# Patient Record
Sex: FEMALE | Race: WHITE | NOT HISPANIC OR LATINO | URBAN - METROPOLITAN AREA
[De-identification: names, ages, dates, MRNs, and addresses within clinical notes are randomized per-mention and may not be internally consistent; named-entity substitution may affect disease eponyms.]

---

## 2019-02-25 ENCOUNTER — OFFICE (OUTPATIENT)
Dept: URBAN - METROPOLITAN AREA CLINIC 19 | Facility: CLINIC | Age: 59
End: 2019-02-25

## 2019-02-25 VITALS
WEIGHT: 183 LBS | DIASTOLIC BLOOD PRESSURE: 69 MMHG | SYSTOLIC BLOOD PRESSURE: 137 MMHG | HEART RATE: 93 BPM | HEIGHT: 66 IN

## 2019-02-25 DIAGNOSIS — R13.10 DYSPHAGIA, UNSPECIFIED: ICD-10-CM

## 2019-02-25 DIAGNOSIS — R07.9 CHEST PAIN, UNSPECIFIED: ICD-10-CM

## 2019-02-25 DIAGNOSIS — K52.89 OTHER SPECIFIED NONINFECTIVE GASTROENTERITIS AND COLITIS: ICD-10-CM

## 2019-02-25 DIAGNOSIS — R11.0 NAUSEA: ICD-10-CM

## 2019-02-25 DIAGNOSIS — Z86.010 PERSONAL HISTORY OF COLONIC POLYPS: ICD-10-CM

## 2019-02-25 PROCEDURE — 99214 OFFICE O/P EST MOD 30 MIN: CPT | Performed by: INTERNAL MEDICINE

## 2019-05-02 ENCOUNTER — OFFICE (OUTPATIENT)
Dept: URBAN - METROPOLITAN AREA CLINIC 19 | Facility: CLINIC | Age: 59
End: 2019-05-02

## 2019-05-02 VITALS
HEART RATE: 94 BPM | HEIGHT: 66 IN | DIASTOLIC BLOOD PRESSURE: 58 MMHG | SYSTOLIC BLOOD PRESSURE: 121 MMHG | WEIGHT: 182 LBS

## 2019-05-02 DIAGNOSIS — D50.9 IRON DEFICIENCY ANEMIA, UNSPECIFIED: ICD-10-CM

## 2019-05-02 DIAGNOSIS — K52.89 OTHER SPECIFIED NONINFECTIVE GASTROENTERITIS AND COLITIS: ICD-10-CM

## 2019-05-02 DIAGNOSIS — Z86.010 PERSONAL HISTORY OF COLONIC POLYPS: ICD-10-CM

## 2019-05-02 PROCEDURE — 99213 OFFICE O/P EST LOW 20 MIN: CPT | Performed by: INTERNAL MEDICINE

## 2019-05-02 RX ORDER — SODIUM PICOSULFATE, MAGNESIUM OXIDE, AND ANHYDROUS CITRIC ACID 10; 3.5; 12 MG/160ML; G/160ML; G/160ML
LIQUID ORAL
Qty: 1 | Refills: 0 | Status: COMPLETED
Start: 2019-05-02 | End: 2019-08-05

## 2019-08-05 ENCOUNTER — AMBULATORY SURGICAL CENTER (OUTPATIENT)
Dept: URBAN - METROPOLITAN AREA SURGERY 2 | Facility: SURGERY | Age: 59
End: 2019-08-05
Payer: COMMERCIAL

## 2019-08-05 ENCOUNTER — OFFICE (OUTPATIENT)
Dept: URBAN - METROPOLITAN AREA PATHOLOGY 22 | Facility: PATHOLOGY | Age: 59
End: 2019-08-05
Payer: COMMERCIAL

## 2019-08-05 VITALS
DIASTOLIC BLOOD PRESSURE: 57 MMHG | SYSTOLIC BLOOD PRESSURE: 106 MMHG | HEIGHT: 66 IN | RESPIRATION RATE: 16 BRPM | SYSTOLIC BLOOD PRESSURE: 117 MMHG | SYSTOLIC BLOOD PRESSURE: 106 MMHG | HEIGHT: 66 IN | SYSTOLIC BLOOD PRESSURE: 106 MMHG | DIASTOLIC BLOOD PRESSURE: 57 MMHG | OXYGEN SATURATION: 96 % | TEMPERATURE: 98 F | DIASTOLIC BLOOD PRESSURE: 60 MMHG | DIASTOLIC BLOOD PRESSURE: 67 MMHG | RESPIRATION RATE: 16 BRPM | HEART RATE: 84 BPM | HEART RATE: 83 BPM | TEMPERATURE: 98 F | TEMPERATURE: 97.5 F | RESPIRATION RATE: 16 BRPM | DIASTOLIC BLOOD PRESSURE: 60 MMHG | RESPIRATION RATE: 18 BRPM | RESPIRATION RATE: 18 BRPM | WEIGHT: 190 LBS | SYSTOLIC BLOOD PRESSURE: 102 MMHG | SYSTOLIC BLOOD PRESSURE: 100 MMHG | DIASTOLIC BLOOD PRESSURE: 67 MMHG | TEMPERATURE: 98 F | SYSTOLIC BLOOD PRESSURE: 117 MMHG | DIASTOLIC BLOOD PRESSURE: 56 MMHG | DIASTOLIC BLOOD PRESSURE: 60 MMHG | HEART RATE: 83 BPM | DIASTOLIC BLOOD PRESSURE: 67 MMHG | WEIGHT: 190 LBS | OXYGEN SATURATION: 96 % | SYSTOLIC BLOOD PRESSURE: 100 MMHG | HEART RATE: 80 BPM | HEART RATE: 82 BPM | WEIGHT: 190 LBS | HEART RATE: 84 BPM | HEART RATE: 84 BPM | DIASTOLIC BLOOD PRESSURE: 56 MMHG | HEART RATE: 80 BPM | OXYGEN SATURATION: 96 % | SYSTOLIC BLOOD PRESSURE: 102 MMHG | DIASTOLIC BLOOD PRESSURE: 57 MMHG | SYSTOLIC BLOOD PRESSURE: 100 MMHG | HEART RATE: 82 BPM | HEART RATE: 82 BPM | TEMPERATURE: 97.5 F | HEART RATE: 83 BPM | RESPIRATION RATE: 18 BRPM | HEART RATE: 80 BPM | SYSTOLIC BLOOD PRESSURE: 117 MMHG | DIASTOLIC BLOOD PRESSURE: 56 MMHG | HEIGHT: 66 IN | TEMPERATURE: 97.5 F | SYSTOLIC BLOOD PRESSURE: 102 MMHG

## 2019-08-05 DIAGNOSIS — Z86.010 PERSONAL HISTORY OF COLONIC POLYPS: ICD-10-CM

## 2019-08-05 DIAGNOSIS — D50.9 IRON DEFICIENCY ANEMIA, UNSPECIFIED: ICD-10-CM

## 2019-08-05 DIAGNOSIS — R19.7 DIARRHEA, UNSPECIFIED: ICD-10-CM

## 2019-08-05 DIAGNOSIS — R07.9 CHEST PAIN, UNSPECIFIED: ICD-10-CM

## 2019-08-05 DIAGNOSIS — J45.909 UNSPECIFIED ASTHMA, UNCOMPLICATED: ICD-10-CM

## 2019-08-05 PROBLEM — D53.9 UNEXPLAINED IRON DEFICIENCY ANEMIA: Status: ACTIVE | Noted: 2019-08-05

## 2019-08-05 PROBLEM — K52.89 CLINICALLY SIGNIFICANT DIARRHEA OF UNEXPLAINED ORIGIN: Status: ACTIVE | Noted: 2019-08-05

## 2019-08-05 PROCEDURE — 88305 TISSUE EXAM BY PATHOLOGIST: CPT | Performed by: INTERNAL MEDICINE

## 2019-08-05 PROCEDURE — 88342 IMHCHEM/IMCYTCHM 1ST ANTB: CPT | Performed by: INTERNAL MEDICINE

## 2019-08-05 PROCEDURE — 43235 EGD DIAGNOSTIC BRUSH WASH: CPT | Mod: 51 | Performed by: INTERNAL MEDICINE

## 2019-08-05 PROCEDURE — 45380 COLONOSCOPY AND BIOPSY: CPT | Performed by: INTERNAL MEDICINE

## 2019-08-27 ENCOUNTER — OFFICE (OUTPATIENT)
Dept: URBAN - METROPOLITAN AREA CLINIC 11 | Facility: CLINIC | Age: 59
End: 2019-08-27
Payer: COMMERCIAL

## 2019-08-27 DIAGNOSIS — D50.9 IRON DEFICIENCY ANEMIA, UNSPECIFIED: ICD-10-CM

## 2019-08-27 PROCEDURE — 91110 GI TRC IMG INTRAL ESOPH-ILE: CPT | Performed by: INTERNAL MEDICINE

## 2021-06-14 ENCOUNTER — OFFICE (OUTPATIENT)
Dept: URBAN - METROPOLITAN AREA CLINIC 19 | Facility: CLINIC | Age: 61
End: 2021-06-14

## 2021-06-14 VITALS
DIASTOLIC BLOOD PRESSURE: 53 MMHG | HEIGHT: 66 IN | OXYGEN SATURATION: 95 % | HEART RATE: 85 BPM | SYSTOLIC BLOOD PRESSURE: 121 MMHG | WEIGHT: 176 LBS

## 2021-06-14 DIAGNOSIS — R74.01 ELEVATION OF LEVELS OF LIVER TRANSAMINASE LEVELS: ICD-10-CM

## 2021-06-14 LAB
HEPATIC FUNCTION PANEL (7): ALBUMIN: 4.5 G/DL (ref 3.8–4.8)
HEPATIC FUNCTION PANEL (7): ALKALINE PHOSPHATASE: 134 IU/L — HIGH (ref 48–121)
HEPATIC FUNCTION PANEL (7): ALT (SGPT): 47 IU/L — HIGH (ref 0–32)
HEPATIC FUNCTION PANEL (7): AST (SGOT): 59 IU/L — HIGH (ref 0–40)
HEPATIC FUNCTION PANEL (7): BILIRUBIN, DIRECT: 0.3 MG/DL (ref 0–0.4)
HEPATIC FUNCTION PANEL (7): BILIRUBIN, TOTAL: 0.7 MG/DL (ref 0–1.2)
HEPATIC FUNCTION PANEL (7): PROTEIN, TOTAL: 7.1 G/DL (ref 6–8.5)

## 2021-06-14 PROCEDURE — 99214 OFFICE O/P EST MOD 30 MIN: CPT | Performed by: INTERNAL MEDICINE

## 2021-06-15 ENCOUNTER — OFFICE (OUTPATIENT)
Dept: URBAN - METROPOLITAN AREA CLINIC 14 | Facility: CLINIC | Age: 61
End: 2021-06-15

## 2021-06-15 VITALS — HEIGHT: 66 IN

## 2021-06-15 DIAGNOSIS — R74.01 ELEVATION OF LEVELS OF LIVER TRANSAMINASE LEVELS: ICD-10-CM

## 2021-06-15 PROCEDURE — 91200 LIVER ELASTOGRAPHY: CPT | Performed by: INTERNAL MEDICINE

## 2021-06-17 ENCOUNTER — OFFICE (OUTPATIENT)
Dept: URBAN - METROPOLITAN AREA CLINIC 19 | Facility: CLINIC | Age: 61
End: 2021-06-17

## 2021-07-06 ENCOUNTER — OFFICE (OUTPATIENT)
Dept: URBAN - METROPOLITAN AREA CLINIC 19 | Facility: CLINIC | Age: 61
End: 2021-07-06
Payer: COMMERCIAL

## 2021-07-06 VITALS — HEIGHT: 66 IN

## 2021-07-06 DIAGNOSIS — Z23 ENCOUNTER FOR IMMUNIZATION: ICD-10-CM

## 2021-07-06 PROCEDURE — 90636 HEP A/HEP B VACC ADULT IM: CPT | Performed by: INTERNAL MEDICINE

## 2021-07-06 PROCEDURE — 90471 IMMUNIZATION ADMIN: CPT | Performed by: INTERNAL MEDICINE

## 2021-07-20 ENCOUNTER — OFFICE (OUTPATIENT)
Dept: URBAN - METROPOLITAN AREA CLINIC 19 | Facility: CLINIC | Age: 61
End: 2021-07-20

## 2021-07-20 VITALS
HEIGHT: 66 IN | SYSTOLIC BLOOD PRESSURE: 103 MMHG | WEIGHT: 174 LBS | OXYGEN SATURATION: 97 % | HEART RATE: 85 BPM | DIASTOLIC BLOOD PRESSURE: 55 MMHG

## 2021-07-20 DIAGNOSIS — K75.81 NONALCOHOLIC STEATOHEPATITIS (NASH): ICD-10-CM

## 2021-07-20 DIAGNOSIS — K74.69 OTHER CIRRHOSIS OF LIVER: ICD-10-CM

## 2021-07-20 PROCEDURE — 99213 OFFICE O/P EST LOW 20 MIN: CPT | Performed by: NURSE PRACTITIONER

## 2021-07-20 NOTE — SERVICEHPINOTES
Mrs. Salas   Presents for follow-up on her cirrhosis.  A fiber scan showed F4 with median liver stiffness elevated at 41. Her cap score was 247, slightly negative for hepatic steatosis.  She has lost over 100 lb from her heaviest weight.  Blood work was unremarkable for liver disease. Recent blood work showed alkaline phosphatase 134, AST 59, ALT 47. Albumin bilirubin and protein were all unremarkable.  She does have thrombocytopenia with recent platelets of 104. She does have iron deficiency anemia, with GI workup including EGD, colonoscopy, and pill capsule normal. Her AFP in June of this year was slightly elevated at 9.2 and abdominal ultrasound done in May was negative for mass seen in the liver.  It did show intrinsic liver disease.

## 2021-08-06 ENCOUNTER — OFFICE (OUTPATIENT)
Dept: URBAN - METROPOLITAN AREA CLINIC 19 | Facility: CLINIC | Age: 61
End: 2021-08-06

## 2021-09-08 ENCOUNTER — OFFICE (OUTPATIENT)
Dept: URBAN - METROPOLITAN AREA CLINIC 19 | Facility: CLINIC | Age: 61
End: 2021-09-08

## 2021-09-16 ENCOUNTER — OFFICE (OUTPATIENT)
Dept: URBAN - METROPOLITAN AREA CLINIC 19 | Facility: CLINIC | Age: 61
End: 2021-09-16

## 2021-09-16 PROCEDURE — STUDY: HCPCS | Performed by: INTERNAL MEDICINE

## 2021-10-01 ENCOUNTER — AMBULATORY SURGICAL CENTER (OUTPATIENT)
Dept: URBAN - METROPOLITAN AREA SURGERY 2 | Facility: SURGERY | Age: 61
End: 2021-10-01
Payer: COMMERCIAL

## 2021-10-01 VITALS
SYSTOLIC BLOOD PRESSURE: 106 MMHG | HEART RATE: 80 BPM | DIASTOLIC BLOOD PRESSURE: 50 MMHG | DIASTOLIC BLOOD PRESSURE: 41 MMHG | WEIGHT: 166 LBS | DIASTOLIC BLOOD PRESSURE: 39 MMHG | OXYGEN SATURATION: 96 % | DIASTOLIC BLOOD PRESSURE: 47 MMHG | WEIGHT: 166 LBS | OXYGEN SATURATION: 95 % | DIASTOLIC BLOOD PRESSURE: 50 MMHG | DIASTOLIC BLOOD PRESSURE: 64 MMHG | OXYGEN SATURATION: 97 % | OXYGEN SATURATION: 96 % | OXYGEN SATURATION: 97 % | HEART RATE: 74 BPM | HEART RATE: 76 BPM | SYSTOLIC BLOOD PRESSURE: 96 MMHG | RESPIRATION RATE: 17 BRPM | WEIGHT: 166 LBS | HEART RATE: 73 BPM | OXYGEN SATURATION: 94 % | HEART RATE: 80 BPM | HEART RATE: 73 BPM | SYSTOLIC BLOOD PRESSURE: 101 MMHG | SYSTOLIC BLOOD PRESSURE: 96 MMHG | SYSTOLIC BLOOD PRESSURE: 96 MMHG | DIASTOLIC BLOOD PRESSURE: 41 MMHG | DIASTOLIC BLOOD PRESSURE: 39 MMHG | SYSTOLIC BLOOD PRESSURE: 101 MMHG | RESPIRATION RATE: 18 BRPM | OXYGEN SATURATION: 96 % | SYSTOLIC BLOOD PRESSURE: 106 MMHG | OXYGEN SATURATION: 95 % | OXYGEN SATURATION: 94 % | DIASTOLIC BLOOD PRESSURE: 47 MMHG | SYSTOLIC BLOOD PRESSURE: 106 MMHG | SYSTOLIC BLOOD PRESSURE: 102 MMHG | DIASTOLIC BLOOD PRESSURE: 64 MMHG | OXYGEN SATURATION: 97 % | TEMPERATURE: 97.8 F | HEART RATE: 73 BPM | HEART RATE: 76 BPM | HEIGHT: 66 IN | DIASTOLIC BLOOD PRESSURE: 41 MMHG | HEART RATE: 74 BPM | DIASTOLIC BLOOD PRESSURE: 64 MMHG | RESPIRATION RATE: 17 BRPM | HEART RATE: 76 BPM | RESPIRATION RATE: 18 BRPM | HEIGHT: 66 IN | OXYGEN SATURATION: 94 % | DIASTOLIC BLOOD PRESSURE: 47 MMHG | TEMPERATURE: 97 F | DIASTOLIC BLOOD PRESSURE: 50 MMHG | DIASTOLIC BLOOD PRESSURE: 39 MMHG | SYSTOLIC BLOOD PRESSURE: 102 MMHG | SYSTOLIC BLOOD PRESSURE: 90 MMHG | TEMPERATURE: 97 F | TEMPERATURE: 97.8 F | TEMPERATURE: 97.8 F | TEMPERATURE: 97 F | HEART RATE: 80 BPM | SYSTOLIC BLOOD PRESSURE: 90 MMHG | SYSTOLIC BLOOD PRESSURE: 101 MMHG | HEIGHT: 66 IN | SYSTOLIC BLOOD PRESSURE: 102 MMHG | SYSTOLIC BLOOD PRESSURE: 90 MMHG | RESPIRATION RATE: 18 BRPM | HEART RATE: 74 BPM | OXYGEN SATURATION: 95 % | RESPIRATION RATE: 17 BRPM

## 2021-10-01 DIAGNOSIS — I85.00 ESOPHAGEAL VARICES WITHOUT BLEEDING: ICD-10-CM

## 2021-10-01 DIAGNOSIS — K75.81 NONALCOHOLIC STEATOHEPATITIS (NASH): ICD-10-CM

## 2021-10-01 DIAGNOSIS — K74.60 UNSPECIFIED CIRRHOSIS OF LIVER: ICD-10-CM

## 2021-10-01 PROBLEM — K31.89 OTHER DISEASES OF STOMACH AND DUODENUM: Status: ACTIVE | Noted: 2021-10-01

## 2021-10-01 PROCEDURE — 43235 EGD DIAGNOSTIC BRUSH WASH: CPT | Performed by: INTERNAL MEDICINE

## 2021-11-10 ENCOUNTER — OFFICE (OUTPATIENT)
Dept: URBAN - METROPOLITAN AREA CLINIC 19 | Facility: CLINIC | Age: 61
End: 2021-11-10
Payer: COMMERCIAL

## 2021-11-10 VITALS
SYSTOLIC BLOOD PRESSURE: 111 MMHG | DIASTOLIC BLOOD PRESSURE: 41 MMHG | HEIGHT: 66 IN | WEIGHT: 168 LBS | OXYGEN SATURATION: 97 % | HEART RATE: 68 BPM

## 2021-11-10 DIAGNOSIS — R14.3 FLATULENCE: ICD-10-CM

## 2021-11-10 DIAGNOSIS — K75.81 NONALCOHOLIC STEATOHEPATITIS (NASH): ICD-10-CM

## 2021-11-10 DIAGNOSIS — K74.60 UNSPECIFIED CIRRHOSIS OF LIVER: ICD-10-CM

## 2021-11-10 LAB
AFP, SERUM, TUMOR MARKER: 8.3 NG/ML (ref 0–8.3)
AMMONIA, PLASMA: 302 UG/DL — CRITICAL HIGH (ref 34–178)
CBC, PLATELET, NO DIFFERENTIAL: HEMATOCRIT: 40.7 % (ref 34–46.6)
CBC, PLATELET, NO DIFFERENTIAL: HEMOGLOBIN: 13.1 G/DL (ref 11.1–15.9)
CBC, PLATELET, NO DIFFERENTIAL: MCH: 32.2 PG (ref 26.6–33)
CBC, PLATELET, NO DIFFERENTIAL: MCHC: 32.2 G/DL (ref 31.5–35.7)
CBC, PLATELET, NO DIFFERENTIAL: MCV: 100 FL — HIGH (ref 79–97)
CBC, PLATELET, NO DIFFERENTIAL: PLATELETS: 100 X10E3/UL — CRITICAL LOW (ref 150–450)
CBC, PLATELET, NO DIFFERENTIAL: RBC: 4.07 X10E6/UL (ref 3.77–5.28)
CBC, PLATELET, NO DIFFERENTIAL: RDW: 13.4 % (ref 11.7–15.4)
CBC, PLATELET, NO DIFFERENTIAL: WBC: 3.7 X10E3/UL (ref 3.4–10.8)
COMP. METABOLIC PANEL (14): A/G RATIO: 1.8 (ref 1.2–2.2)
COMP. METABOLIC PANEL (14): ALBUMIN: 4.2 G/DL (ref 3.8–4.8)
COMP. METABOLIC PANEL (14): ALKALINE PHOSPHATASE: 126 IU/L — HIGH (ref 44–121)
COMP. METABOLIC PANEL (14): ALT (SGPT): 75 IU/L — HIGH (ref 0–32)
COMP. METABOLIC PANEL (14): AST (SGOT): 80 IU/L — HIGH (ref 0–40)
COMP. METABOLIC PANEL (14): BILIRUBIN, TOTAL: 0.8 MG/DL (ref 0–1.2)
COMP. METABOLIC PANEL (14): BUN/CREATININE RATIO: 16 (ref 12–28)
COMP. METABOLIC PANEL (14): BUN: 11 MG/DL (ref 8–27)
COMP. METABOLIC PANEL (14): CALCIUM: 9.2 MG/DL (ref 8.7–10.3)
COMP. METABOLIC PANEL (14): CARBON DIOXIDE, TOTAL: 21 MMOL/L (ref 20–29)
COMP. METABOLIC PANEL (14): CHLORIDE: 107 MMOL/L — HIGH (ref 96–106)
COMP. METABOLIC PANEL (14): CREATININE: 0.69 MG/DL (ref 0.57–1)
COMP. METABOLIC PANEL (14): EGFR IF AFRICN AM: 108 ML/MIN/1.73 (ref 59–?)
COMP. METABOLIC PANEL (14): EGFR IF NONAFRICN AM: 94 ML/MIN/1.73 (ref 59–?)
COMP. METABOLIC PANEL (14): GLOBULIN, TOTAL: 2.4 G/DL (ref 1.5–4.5)
COMP. METABOLIC PANEL (14): GLUCOSE: 121 MG/DL — HIGH (ref 65–99)
COMP. METABOLIC PANEL (14): POTASSIUM: 4.3 MMOL/L (ref 3.5–5.2)
COMP. METABOLIC PANEL (14): PROTEIN, TOTAL: 6.6 G/DL (ref 6–8.5)
COMP. METABOLIC PANEL (14): SODIUM: 144 MMOL/L (ref 134–144)
PROTHROMBIN TIME (PT): INR: 1.1 (ref 0.9–1.2)
PROTHROMBIN TIME (PT): PROTHROMBIN TIME: 11.7 SEC (ref 9.1–12)

## 2021-11-10 PROCEDURE — 99214 OFFICE O/P EST MOD 30 MIN: CPT | Performed by: INTERNAL MEDICINE

## 2021-11-10 RX ORDER — RIFAXIMIN 550 MG/1
1100 TABLET ORAL
Qty: 60 | Refills: 11 | Status: COMPLETED
Start: 2021-11-10 | End: 2023-02-21

## 2022-02-07 ENCOUNTER — OFFICE (OUTPATIENT)
Dept: URBAN - METROPOLITAN AREA CLINIC 19 | Facility: CLINIC | Age: 62
End: 2022-02-07

## 2022-02-07 VITALS
DIASTOLIC BLOOD PRESSURE: 55 MMHG | WEIGHT: 171 LBS | SYSTOLIC BLOOD PRESSURE: 113 MMHG | OXYGEN SATURATION: 98 % | HEIGHT: 66 IN | HEART RATE: 71 BPM

## 2022-02-07 DIAGNOSIS — R19.7 DIARRHEA, UNSPECIFIED: ICD-10-CM

## 2022-02-07 DIAGNOSIS — K74.60 UNSPECIFIED CIRRHOSIS OF LIVER: ICD-10-CM

## 2022-02-07 LAB
AFP, SERUM, TUMOR MARKER: 7.5 NG/ML (ref 0–8.3)
CBC, PLATELET, NO DIFFERENTIAL: HEMATOCRIT: 41 % (ref 34–46.6)
CBC, PLATELET, NO DIFFERENTIAL: HEMOGLOBIN: 12.4 G/DL (ref 11.1–15.9)
CBC, PLATELET, NO DIFFERENTIAL: MCH: 31.4 PG (ref 26.6–33)
CBC, PLATELET, NO DIFFERENTIAL: MCHC: 30.2 G/DL — LOW (ref 31.5–35.7)
CBC, PLATELET, NO DIFFERENTIAL: MCV: 104 FL — HIGH (ref 79–97)
CBC, PLATELET, NO DIFFERENTIAL: PLATELETS: 102 X10E3/UL — LOW (ref 150–450)
CBC, PLATELET, NO DIFFERENTIAL: RBC: 3.95 X10E6/UL (ref 3.77–5.28)
CBC, PLATELET, NO DIFFERENTIAL: RDW: 14.6 % (ref 11.7–15.4)
CBC, PLATELET, NO DIFFERENTIAL: WBC: 3.7 X10E3/UL (ref 3.4–10.8)
COMP. METABOLIC PANEL (14): A/G RATIO: 1.8 (ref 1.2–2.2)
COMP. METABOLIC PANEL (14): ALBUMIN: 4.4 G/DL (ref 3.8–4.8)
COMP. METABOLIC PANEL (14): ALKALINE PHOSPHATASE: 117 IU/L (ref 44–121)
COMP. METABOLIC PANEL (14): ALT (SGPT): 34 IU/L — HIGH (ref 0–32)
COMP. METABOLIC PANEL (14): AST (SGOT): 49 IU/L — HIGH (ref 0–40)
COMP. METABOLIC PANEL (14): BILIRUBIN, TOTAL: 0.6 MG/DL (ref 0–1.2)
COMP. METABOLIC PANEL (14): BUN/CREATININE RATIO: 18 (ref 12–28)
COMP. METABOLIC PANEL (14): BUN: 11 MG/DL (ref 8–27)
COMP. METABOLIC PANEL (14): CALCIUM: 9.4 MG/DL (ref 8.7–10.3)
COMP. METABOLIC PANEL (14): CARBON DIOXIDE, TOTAL: 19 MMOL/L — LOW (ref 20–29)
COMP. METABOLIC PANEL (14): CHLORIDE: 109 MMOL/L — HIGH (ref 96–106)
COMP. METABOLIC PANEL (14): CREATININE: 0.61 MG/DL (ref 0.57–1)
COMP. METABOLIC PANEL (14): EGFR IF AFRICN AM: 112 ML/MIN/1.73 (ref 59–?)
COMP. METABOLIC PANEL (14): EGFR IF NONAFRICN AM: 97 ML/MIN/1.73 (ref 59–?)
COMP. METABOLIC PANEL (14): GLOBULIN, TOTAL: 2.4 G/DL (ref 1.5–4.5)
COMP. METABOLIC PANEL (14): GLUCOSE: 151 MG/DL — HIGH (ref 65–99)
COMP. METABOLIC PANEL (14): POTASSIUM: 4.2 MMOL/L (ref 3.5–5.2)
COMP. METABOLIC PANEL (14): PROTEIN, TOTAL: 6.8 G/DL (ref 6–8.5)
COMP. METABOLIC PANEL (14): SODIUM: 145 MMOL/L — HIGH (ref 134–144)
PROTHROMBIN TIME (PT): INR: 1.1 (ref 0.9–1.2)
PROTHROMBIN TIME (PT): PROTHROMBIN TIME: 11.3 SEC (ref 9.1–12)

## 2022-02-07 PROCEDURE — 99213 OFFICE O/P EST LOW 20 MIN: CPT | Performed by: INTERNAL MEDICINE

## 2022-03-04 ENCOUNTER — OFFICE (OUTPATIENT)
Dept: URBAN - METROPOLITAN AREA CLINIC 19 | Facility: CLINIC | Age: 62
End: 2022-03-04
Payer: COMMERCIAL

## 2022-03-04 VITALS — HEIGHT: 66 IN

## 2022-03-04 DIAGNOSIS — Z23 ENCOUNTER FOR IMMUNIZATION: ICD-10-CM

## 2022-03-04 PROCEDURE — 90471 IMMUNIZATION ADMIN: CPT | Performed by: INTERNAL MEDICINE

## 2022-03-04 PROCEDURE — 90636 HEP A/HEP B VACC ADULT IM: CPT | Performed by: INTERNAL MEDICINE

## 2023-01-01 ENCOUNTER — OFFICE (OUTPATIENT)
Dept: URBAN - METROPOLITAN AREA CLINIC 19 | Facility: CLINIC | Age: 63
End: 2023-01-01
Payer: COMMERCIAL

## 2023-01-01 ENCOUNTER — INPATIENT HOSPITAL (OUTPATIENT)
Dept: URBAN - METROPOLITAN AREA HOSPITAL 95 | Facility: HOSPITAL | Age: 63
End: 2023-01-01
Payer: COMMERCIAL

## 2023-01-01 ENCOUNTER — AMBULATORY SURGICAL CENTER (OUTPATIENT)
Dept: URBAN - METROPOLITAN AREA SURGERY 2 | Facility: SURGERY | Age: 63
End: 2023-01-01
Payer: COMMERCIAL

## 2023-01-01 VITALS
SYSTOLIC BLOOD PRESSURE: 118 MMHG | OXYGEN SATURATION: 100 % | HEART RATE: 75 BPM | TEMPERATURE: 98 F | HEART RATE: 76 BPM | SYSTOLIC BLOOD PRESSURE: 103 MMHG | DIASTOLIC BLOOD PRESSURE: 52 MMHG | OXYGEN SATURATION: 98 % | DIASTOLIC BLOOD PRESSURE: 44 MMHG | OXYGEN SATURATION: 100 % | WEIGHT: 161.2 LBS | DIASTOLIC BLOOD PRESSURE: 58 MMHG | OXYGEN SATURATION: 98 % | RESPIRATION RATE: 18 BRPM | OXYGEN SATURATION: 100 % | HEART RATE: 72 BPM | DIASTOLIC BLOOD PRESSURE: 67 MMHG | HEIGHT: 66 IN | TEMPERATURE: 97.8 F | SYSTOLIC BLOOD PRESSURE: 103 MMHG | DIASTOLIC BLOOD PRESSURE: 44 MMHG | SYSTOLIC BLOOD PRESSURE: 101 MMHG | DIASTOLIC BLOOD PRESSURE: 52 MMHG | HEIGHT: 66 IN | DIASTOLIC BLOOD PRESSURE: 48 MMHG | OXYGEN SATURATION: 96 % | HEART RATE: 79 BPM | DIASTOLIC BLOOD PRESSURE: 67 MMHG | DIASTOLIC BLOOD PRESSURE: 44 MMHG | HEART RATE: 72 BPM | HEART RATE: 75 BPM | SYSTOLIC BLOOD PRESSURE: 118 MMHG | HEART RATE: 72 BPM | OXYGEN SATURATION: 95 % | RESPIRATION RATE: 18 BRPM | SYSTOLIC BLOOD PRESSURE: 101 MMHG | OXYGEN SATURATION: 95 % | HEART RATE: 79 BPM | TEMPERATURE: 97.8 F | SYSTOLIC BLOOD PRESSURE: 118 MMHG | RESPIRATION RATE: 21 BRPM | OXYGEN SATURATION: 96 % | RESPIRATION RATE: 21 BRPM | SYSTOLIC BLOOD PRESSURE: 111 MMHG | WEIGHT: 161.2 LBS | SYSTOLIC BLOOD PRESSURE: 108 MMHG | HEART RATE: 75 BPM | DIASTOLIC BLOOD PRESSURE: 52 MMHG | SYSTOLIC BLOOD PRESSURE: 111 MMHG | SYSTOLIC BLOOD PRESSURE: 108 MMHG | WEIGHT: 161.2 LBS | SYSTOLIC BLOOD PRESSURE: 103 MMHG | SYSTOLIC BLOOD PRESSURE: 101 MMHG | DIASTOLIC BLOOD PRESSURE: 67 MMHG | DIASTOLIC BLOOD PRESSURE: 58 MMHG | OXYGEN SATURATION: 96 % | RESPIRATION RATE: 21 BRPM | TEMPERATURE: 98 F | TEMPERATURE: 97.8 F | OXYGEN SATURATION: 98 % | SYSTOLIC BLOOD PRESSURE: 111 MMHG | DIASTOLIC BLOOD PRESSURE: 48 MMHG | OXYGEN SATURATION: 95 % | HEIGHT: 66 IN | HEART RATE: 76 BPM | DIASTOLIC BLOOD PRESSURE: 48 MMHG | HEART RATE: 76 BPM | SYSTOLIC BLOOD PRESSURE: 108 MMHG | RESPIRATION RATE: 18 BRPM | HEART RATE: 79 BPM | TEMPERATURE: 98 F | DIASTOLIC BLOOD PRESSURE: 58 MMHG

## 2023-01-01 VITALS
DIASTOLIC BLOOD PRESSURE: 45 MMHG | SYSTOLIC BLOOD PRESSURE: 125 MMHG | OXYGEN SATURATION: 100 % | HEIGHT: 66 IN | HEART RATE: 74 BPM | WEIGHT: 158 LBS

## 2023-01-01 VITALS
SYSTOLIC BLOOD PRESSURE: 113 MMHG | DIASTOLIC BLOOD PRESSURE: 33 MMHG | HEART RATE: 78 BPM | OXYGEN SATURATION: 100 % | WEIGHT: 192 LBS | HEIGHT: 66 IN

## 2023-01-01 VITALS
OXYGEN SATURATION: 97 % | HEART RATE: 68 BPM | HEIGHT: 66 IN | SYSTOLIC BLOOD PRESSURE: 100 MMHG | WEIGHT: 159 LBS | DIASTOLIC BLOOD PRESSURE: 40 MMHG

## 2023-01-01 DIAGNOSIS — K76.82 HEPATIC ENCEPHALOPATHY: ICD-10-CM

## 2023-01-01 DIAGNOSIS — K74.60 UNSPECIFIED CIRRHOSIS OF LIVER: ICD-10-CM

## 2023-01-01 DIAGNOSIS — K75.81 NONALCOHOLIC STEATOHEPATITIS (NASH): ICD-10-CM

## 2023-01-01 DIAGNOSIS — K92.1 MELENA: ICD-10-CM

## 2023-01-01 DIAGNOSIS — K76.6 PORTAL HYPERTENSION: ICD-10-CM

## 2023-01-01 DIAGNOSIS — K31.89 OTHER DISEASES OF STOMACH AND DUODENUM: ICD-10-CM

## 2023-01-01 DIAGNOSIS — K70.30 ALCOHOLIC CIRRHOSIS OF LIVER WITHOUT ASCITES: ICD-10-CM

## 2023-01-01 DIAGNOSIS — R18.8 OTHER ASCITES: ICD-10-CM

## 2023-01-01 DIAGNOSIS — R10.30 LOWER ABDOMINAL PAIN, UNSPECIFIED: ICD-10-CM

## 2023-01-01 DIAGNOSIS — I85.11 SECONDARY ESOPHAGEAL VARICES WITH BLEEDING: ICD-10-CM

## 2023-01-01 DIAGNOSIS — I85.00 ESOPHAGEAL VARICES WITHOUT BLEEDING: ICD-10-CM

## 2023-01-01 LAB
AFP, SERUM, TUMOR MARKER: <1.8 NG/ML
AMMONIA, PLASMA: 64 UG/DL (ref 34–178)
CBC, PLATELET, NO DIFFERENTIAL: HEMATOCRIT: 27 % — LOW (ref 34–46.6)
CBC, PLATELET, NO DIFFERENTIAL: HEMOGLOBIN: 8.7 G/DL — LOW (ref 11.1–15.9)
CBC, PLATELET, NO DIFFERENTIAL: MCH: 33.3 PG — HIGH (ref 26.6–33)
CBC, PLATELET, NO DIFFERENTIAL: MCHC: 32.2 G/DL (ref 31.5–35.7)
CBC, PLATELET, NO DIFFERENTIAL: MCV: 103 FL — HIGH (ref 79–97)
CBC, PLATELET, NO DIFFERENTIAL: PLATELETS: 136 X10E3/UL — LOW (ref 150–450)
CBC, PLATELET, NO DIFFERENTIAL: RBC: 2.61 X10E6/UL — CRITICAL LOW (ref 3.77–5.28)
CBC, PLATELET, NO DIFFERENTIAL: RDW: 12.9 % (ref 11.7–15.4)
CBC, PLATELET, NO DIFFERENTIAL: WBC: 6.8 X10E3/UL (ref 3.4–10.8)
COMP. METABOLIC PANEL (14): A/G RATIO: 0.9 — LOW (ref 1.2–2.2)
COMP. METABOLIC PANEL (14): A/G RATIO: 1.2 (ref 1.2–2.2)
COMP. METABOLIC PANEL (14): ALBUMIN: 3.1 G/DL — LOW (ref 3.9–4.9)
COMP. METABOLIC PANEL (14): ALBUMIN: 3.8 G/DL — LOW (ref 3.9–4.9)
COMP. METABOLIC PANEL (14): ALKALINE PHOSPHATASE: 179 IU/L — HIGH (ref 44–121)
COMP. METABOLIC PANEL (14): ALKALINE PHOSPHATASE: 185 IU/L — HIGH (ref 44–121)
COMP. METABOLIC PANEL (14): ALT (SGPT): 21 IU/L (ref 0–32)
COMP. METABOLIC PANEL (14): ALT (SGPT): 32 IU/L (ref 0–32)
COMP. METABOLIC PANEL (14): AST (SGOT): 33 IU/L (ref 0–40)
COMP. METABOLIC PANEL (14): AST (SGOT): 63 IU/L — HIGH (ref 0–40)
COMP. METABOLIC PANEL (14): BILIRUBIN, TOTAL: 1.2 MG/DL (ref 0–1.2)
COMP. METABOLIC PANEL (14): BILIRUBIN, TOTAL: 1.3 MG/DL — HIGH (ref 0–1.2)
COMP. METABOLIC PANEL (14): BUN/CREATININE RATIO: 12 (ref 12–28)
COMP. METABOLIC PANEL (14): BUN/CREATININE RATIO: 14 (ref 12–28)
COMP. METABOLIC PANEL (14): BUN: 18 MG/DL (ref 8–27)
COMP. METABOLIC PANEL (14): BUN: 50 MG/DL — HIGH (ref 8–27)
COMP. METABOLIC PANEL (14): CALCIUM: 8.8 MG/DL (ref 8.7–10.3)
COMP. METABOLIC PANEL (14): CALCIUM: 9 MG/DL (ref 8.7–10.3)
COMP. METABOLIC PANEL (14): CARBON DIOXIDE, TOTAL: 16 MMOL/L — LOW (ref 20–29)
COMP. METABOLIC PANEL (14): CARBON DIOXIDE, TOTAL: 16 MMOL/L — LOW (ref 20–29)
COMP. METABOLIC PANEL (14): CHLORIDE: 104 MMOL/L (ref 96–106)
COMP. METABOLIC PANEL (14): CHLORIDE: 106 MMOL/L (ref 96–106)
COMP. METABOLIC PANEL (14): CREATININE: 1.46 MG/DL — HIGH (ref 0.57–1)
COMP. METABOLIC PANEL (14): CREATININE: 3.51 MG/DL — HIGH (ref 0.57–1)
COMP. METABOLIC PANEL (14): EGFR: 14 ML/MIN/1.73 — LOW (ref 59–?)
COMP. METABOLIC PANEL (14): EGFR: 40 ML/MIN/1.73 — LOW (ref 59–?)
COMP. METABOLIC PANEL (14): GLOBULIN, TOTAL: 3.2 G/DL (ref 1.5–4.5)
COMP. METABOLIC PANEL (14): GLOBULIN, TOTAL: 3.3 G/DL (ref 1.5–4.5)
COMP. METABOLIC PANEL (14): GLUCOSE: 220 MG/DL — HIGH (ref 70–99)
COMP. METABOLIC PANEL (14): GLUCOSE: 279 MG/DL — HIGH (ref 70–99)
COMP. METABOLIC PANEL (14): POTASSIUM: 4 MMOL/L (ref 3.5–5.2)
COMP. METABOLIC PANEL (14): POTASSIUM: 5.2 MMOL/L (ref 3.5–5.2)
COMP. METABOLIC PANEL (14): PROTEIN, TOTAL: 6.4 G/DL (ref 6–8.5)
COMP. METABOLIC PANEL (14): PROTEIN, TOTAL: 7 G/DL (ref 6–8.5)
COMP. METABOLIC PANEL (14): SODIUM: 136 MMOL/L (ref 134–144)
COMP. METABOLIC PANEL (14): SODIUM: 137 MMOL/L (ref 134–144)
PROTHROMBIN TIME (PT): INR: 1.3 — HIGH (ref 0.9–1.2)
PROTHROMBIN TIME (PT): PROTHROMBIN TIME: 13 SEC — HIGH (ref 9.1–12)

## 2023-01-01 PROCEDURE — 99232 SBSQ HOSP IP/OBS MODERATE 35: CPT | Performed by: INTERNAL MEDICINE

## 2023-01-01 PROCEDURE — 43244 EGD VARICES LIGATION: CPT | Performed by: INTERNAL MEDICINE

## 2023-01-01 PROCEDURE — 99214 OFFICE O/P EST MOD 30 MIN: CPT | Performed by: INTERNAL MEDICINE

## 2023-01-01 PROCEDURE — 99222 1ST HOSP IP/OBS MODERATE 55: CPT | Mod: FS | Performed by: NURSE PRACTITIONER

## 2023-01-01 PROCEDURE — 43235 EGD DIAGNOSTIC BRUSH WASH: CPT | Performed by: INTERNAL MEDICINE

## 2023-01-01 PROCEDURE — 99214 OFFICE O/P EST MOD 30 MIN: CPT | Performed by: NURSE PRACTITIONER

## 2023-01-01 RX ORDER — SPIRONOLACTONE 100 MG/1
200 TABLET, FILM COATED ORAL
Qty: 180 | Refills: 3 | Status: ACTIVE
Start: 2023-01-01

## 2023-01-01 RX ORDER — FUROSEMIDE 40 MG/1
40 TABLET ORAL
Qty: 180 | Refills: 3 | Status: ACTIVE
Start: 2023-01-01

## 2023-09-05 PROBLEM — I85.01 ESOPHAGEAL VARICES WITH BLEEDING: Status: ACTIVE | Noted: 2023-01-01

## 2023-12-27 PROBLEM — K74.60 CIRRHOSIS OF LIVER WITHOUT MENTION OF ALCOHOL: Status: ACTIVE | Noted: 2021-10-01

## 2025-06-05 NOTE — SERVICEROSSYSTEMNOTES
Writer at [employer] team received PA request for Qulipta    Per patients insurance plan Qulipta will only be covered if patient has had a documented trial and failure of the following medications preferred by plan:        If patient has not tried at least ONE of these medications, patient will need to have a documented contraindication to ALL medications listed.    As patient does not meet insurances criteria for coverage at this time, [employer] team will close this request and exit this encounter.    Please send new prescription for step therapy if appropriate.     bleeding varicies